# Patient Record
Sex: FEMALE | Race: BLACK OR AFRICAN AMERICAN | Employment: FULL TIME | URBAN - METROPOLITAN AREA
[De-identification: names, ages, dates, MRNs, and addresses within clinical notes are randomized per-mention and may not be internally consistent; named-entity substitution may affect disease eponyms.]

---

## 2021-10-19 ENCOUNTER — HOSPITAL ENCOUNTER (OUTPATIENT)
Age: 36
Discharge: HOME | End: 2021-10-19
Payer: COMMERCIAL

## 2021-10-19 VITALS
TEMPERATURE: 97 F | DIASTOLIC BLOOD PRESSURE: 81 MMHG | SYSTOLIC BLOOD PRESSURE: 118 MMHG | RESPIRATION RATE: 18 BRPM | OXYGEN SATURATION: 100 % | HEART RATE: 72 BPM

## 2021-10-19 DIAGNOSIS — R51.9 FACIAL PAIN: Primary | ICD-10-CM

## 2021-10-19 DIAGNOSIS — R09.81 SINUS CONGESTION: ICD-10-CM

## 2021-10-19 PROCEDURE — 99203 OFFICE O/P NEW LOW 30 MIN: CPT | Performed by: NURSE PRACTITIONER

## 2021-10-19 NOTE — ED PROVIDER NOTES
Patient Seen in: Immediate [facility]      History   Patient presents with:  Eye Pain    Stated Complaint: Eye issues    Subjective:   Kirit Rodríguez is a 38year-old female who presents to the immediate care complaining of bilateral eye pain/ distress. Appearance: Normal appearance. She is normal weight. She is not ill-appearing or toxic-appearing. HENT:      Head: Normocephalic and atraumatic.       Right Ear: Tympanic membrane, ear canal and external ear normal.      Left Ear: Tympanic m - No data to display    MDM   Sinus congestion, eye pain. Discharge home. Supportive care. Advised patient to go to ER for worsening symptoms. Patient is afebrile, nontoxic in appearance without signs of clinical deterioration.  Patient has no evidence

## 2021-10-19 NOTE — ED INITIAL ASSESSMENT (HPI)
Pt here with complaints of bilateral eye pain that radiates to her head for the last 3 weeks, pt states pt works from home and has a lot of screen time, pt staets she is also having nasal congestion as well, pt denies any fevers

## 2021-10-19 NOTE — ED QUICK NOTES
Pt instructed by NP to go to the ER for a ct scan due to headache pain, pt states she will be going to [facility] via car